# Patient Record
Sex: MALE | Race: WHITE | NOT HISPANIC OR LATINO
[De-identification: names, ages, dates, MRNs, and addresses within clinical notes are randomized per-mention and may not be internally consistent; named-entity substitution may affect disease eponyms.]

---

## 2022-05-24 ENCOUNTER — APPOINTMENT (OUTPATIENT)
Dept: UROLOGY | Facility: CLINIC | Age: 72
End: 2022-05-24
Payer: MEDICARE

## 2022-05-24 VITALS
OXYGEN SATURATION: 96 % | SYSTOLIC BLOOD PRESSURE: 122 MMHG | HEART RATE: 82 BPM | WEIGHT: 196 LBS | HEIGHT: 71 IN | BODY MASS INDEX: 27.44 KG/M2 | TEMPERATURE: 97.2 F | DIASTOLIC BLOOD PRESSURE: 76 MMHG

## 2022-05-24 DIAGNOSIS — R33.9 RETENTION OF URINE, UNSPECIFIED: ICD-10-CM

## 2022-05-24 DIAGNOSIS — Z80.42 FAMILY HISTORY OF MALIGNANT NEOPLASM OF PROSTATE: ICD-10-CM

## 2022-05-24 DIAGNOSIS — I25.10 ATHEROSCLEROTIC HEART DISEASE OF NATIVE CORONARY ARTERY W/OUT ANGINA PECTORIS: ICD-10-CM

## 2022-05-24 DIAGNOSIS — Z78.9 OTHER SPECIFIED HEALTH STATUS: ICD-10-CM

## 2022-05-24 DIAGNOSIS — R39.9 UNSPECIFIED SYMPTOMS AND SIGNS INVOLVING THE GENITOURINARY SYSTEM: ICD-10-CM

## 2022-05-24 DIAGNOSIS — Z80.3 FAMILY HISTORY OF MALIGNANT NEOPLASM OF BREAST: ICD-10-CM

## 2022-05-24 DIAGNOSIS — N52.01 ERECTILE DYSFUNCTION DUE TO ARTERIAL INSUFFICIENCY: ICD-10-CM

## 2022-05-24 DIAGNOSIS — E11.9 TYPE 2 DIABETES MELLITUS W/OUT COMPLICATIONS: ICD-10-CM

## 2022-05-24 DIAGNOSIS — E78.00 PURE HYPERCHOLESTEROLEMIA, UNSPECIFIED: ICD-10-CM

## 2022-05-24 LAB
BILIRUB UR QL STRIP: NORMAL
CLARITY UR: CLEAR
COLLECTION METHOD: NORMAL
GLUCOSE UR-MCNC: NORMAL
HCG UR QL: 0.2 EU/DL
HGB UR QL STRIP.AUTO: NORMAL
KETONES UR-MCNC: NORMAL
LEUKOCYTE ESTERASE UR QL STRIP: NORMAL
NITRITE UR QL STRIP: NORMAL
PH UR STRIP: 6.5
PROT UR STRIP-MCNC: NORMAL
SP GR UR STRIP: 1.01

## 2022-05-24 PROCEDURE — 76857 US EXAM PELVIC LIMITED: CPT

## 2022-05-24 PROCEDURE — 99205 OFFICE O/P NEW HI 60 MIN: CPT

## 2022-05-24 PROCEDURE — 81003 URINALYSIS AUTO W/O SCOPE: CPT | Mod: QW

## 2022-05-24 RX ORDER — EZETIMIBE 10 MG/1
TABLET ORAL
Refills: 0 | Status: ACTIVE | COMMUNITY

## 2022-05-24 RX ORDER — ALFUZOSIN HYDROCHLORIDE 10 MG/1
10 TABLET, EXTENDED RELEASE ORAL DAILY
Qty: 30 | Refills: 11 | Status: ACTIVE | COMMUNITY
Start: 2022-05-24 | End: 1900-01-01

## 2022-05-24 RX ORDER — CLOPIDOGREL 75 MG/1
TABLET, FILM COATED ORAL
Refills: 0 | Status: ACTIVE | COMMUNITY

## 2022-05-24 RX ORDER — INSULIN GLARGINE 100 [IU]/ML
100 INJECTION, SOLUTION SUBCUTANEOUS
Refills: 0 | Status: ACTIVE | COMMUNITY

## 2022-05-24 RX ORDER — INSULIN ASPART 100 [IU]/ML
INJECTION, SOLUTION INTRAVENOUS; SUBCUTANEOUS
Refills: 0 | Status: ACTIVE | COMMUNITY

## 2022-05-24 RX ORDER — ATORVASTATIN CALCIUM 80 MG/1
TABLET, FILM COATED ORAL
Refills: 0 | Status: ACTIVE | COMMUNITY

## 2022-05-24 NOTE — PHYSICAL EXAM
[General Appearance - Well Developed] : well developed [General Appearance - Well Nourished] : well nourished [Normal Appearance] : normal appearance [Well Groomed] : well groomed [General Appearance - In No Acute Distress] : no acute distress [Edema] : no peripheral edema [Respiration, Rhythm And Depth] : normal respiratory rhythm and effort [Exaggerated Use Of Accessory Muscles For Inspiration] : no accessory muscle use [Abdomen Soft] : soft [Abdomen Tenderness] : non-tender [Costovertebral Angle Tenderness] : no ~M costovertebral angle tenderness [Urethral Meatus] : meatus normal [Urinary Bladder Findings] : the bladder was normal on palpation [Scrotum] : the scrotum was normal [Testes Mass (___cm)] : there were no testicular masses [No Prostate Nodules] : no prostate nodules [Normal Station and Gait] : the gait and station were normal for the patient's age [] : no rash [No Focal Deficits] : no focal deficits [Oriented To Time, Place, And Person] : oriented to person, place, and time [Affect] : the affect was normal [Mood] : the mood was normal [Not Anxious] : not anxious [No Palpable Adenopathy] : no palpable adenopathy [Heart Rate And Rhythm] : Heart rate and rhythm were normal [Abdomen Mass (___ Cm)] : no abdominal mass palpated [Abdomen Hernia] : no hernia was discovered [Penis Abnormality] : normal circumcised penis [Testes Tenderness] : no tenderness of the testes [Prostate Tenderness] : the prostate was not tender [Skin Color & Pigmentation] : normal skin color and pigmentation

## 2022-05-24 NOTE — LETTER BODY
[Dear  ___] : Dear  [unfilled], [Consult Letter:] : I had the pleasure of evaluating your patient, [unfilled]. [Please see my note below.] : Please see my note below. [Consult Closing:] : Thank you very much for allowing me to participate in the care of this patient.  If you have any questions, please do not hesitate to contact me. [Sincerely,] : Sincerely, [FreeTextEntry3] : Ar Lake MD, FACS

## 2022-05-24 NOTE — ADDENDUM
[FreeTextEntry1] : A portion of this note was written by [Volodymyr De Luna] on 05/24/2022 acting as a scribe for Dr. Lake. \par \par I have personally reviewed the chart and agree that the record accurately reflects my personal performance of the history, physical exam, assessment, and plan.

## 2022-05-24 NOTE — ASSESSMENT
[FreeTextEntry1] : I discussed the findings and options with . ROBERT FRAZIER in detail.  The few PSAs that I have are somewhat fluctuant.  Based on these and his family history of prostate cancer I advised that he proceed with a prostate MRI.  I described the procedure to him as well as the limitations.  If the MRI is suggestive of prostate cancer, a prostate biopsy should be considered.  He understands that [facility] requires that the procedure be performed exclusively transperineally using a fusion technique. Currently this is performed in the operating room under general anesthesia.  Biopsy related complications were outlined including bleeding (urinary/rectal/ejaculatory), infection, urosepsis (requiring prompt hospitalization), urinary retention (requiring catheterization).  \par \par I have asked Mr. Frazier to please obtain all prior PSAs in chronological order and send them to me for my review.\par \par Regarding the relatively silent urinary retention, we agreed that he would try alfuzosin 10 mg at bedtime, monitoring his blood pressure.  The retention may be multifactorial, including a complication of the diabetes.\par \par \par

## 2022-05-24 NOTE — HISTORY OF PRESENT ILLNESS
[FreeTextEntry1] : Mr. ROBERT FRAZIER comes in today for a urologic evaluation. He presents with a PSA elevation.  He denies any systemic complaints or acute change in his voiding pattern. \par \par From his general urologic history, Mr. FRAZIER reports mild stable lower urinary tract symptoms, with nocturia x 0-1. He has good urinary control. \par IPSS: 5/35\par Sono (performed to assess bladder emptying): 295cc PVR; Prostate 61cc\par \par Mr. Frazier reports longstanding erectile dysfunction, for which he has not pursued treatment as he is not sexually active. \par \par PSAs: 5/5/22--6.72; 3/17/22--8.58; 1/27/22--5.27

## 2022-05-27 LAB — BACTERIA UR CULT: NORMAL

## 2022-06-02 ENCOUNTER — NON-APPOINTMENT (OUTPATIENT)
Age: 72
End: 2022-06-02

## 2022-06-03 ENCOUNTER — APPOINTMENT (OUTPATIENT)
Dept: UROLOGY | Facility: CLINIC | Age: 72
End: 2022-06-03
Payer: MEDICARE

## 2022-06-03 ENCOUNTER — LABORATORY RESULT (OUTPATIENT)
Age: 72
End: 2022-06-03

## 2022-06-03 VITALS
WEIGHT: 196 LBS | SYSTOLIC BLOOD PRESSURE: 105 MMHG | HEIGHT: 71 IN | DIASTOLIC BLOOD PRESSURE: 66 MMHG | BODY MASS INDEX: 27.44 KG/M2 | RESPIRATION RATE: 16 BRPM | HEART RATE: 70 BPM | OXYGEN SATURATION: 98 %

## 2022-06-03 PROCEDURE — 99214 OFFICE O/P EST MOD 30 MIN: CPT

## 2022-06-03 NOTE — ASSESSMENT
[FreeTextEntry1] : 71 year old man with PSA 6.7, prostate MRI with 51 cc prostate, 2.8 cm PIRADS 5 lesion in the anterior midline and 2.0 cm PIRADS 3 lesion in the left PZPL base. He has chronic urinary retention with elevated PVR, recently started alfuzosin.\par \par Prostate cancer screening: the patient and I spoke at length about prostate cancer screening, its risks and its benefits. He understands that many men with prostate cancer will die with the disease rather than of it and we also discussed the results large multi-center American and  prostate cancer screening trials. He also understands that PSA in and of itself does not diagnose prostate cancer but only assesses risk to a certain degree. The patient understands that to definitively screen for prostate cancer, a biopsy is required and this procedure has risks, including bleeding, infection, ED and urinary retention.  The patient opted to move forward with the biopsy, which is performed via a transperineal approach with MRI/US guided fusion targeting.\par \par The patient is aware to expect hematuria x 2 weeks and up to 4 weeks of hematospermia.  There is a risk of infection albeit much lower than a transrectal approach. In some cases patients can experience erectile dysfunction but this is usually self limiting.  Any fever/chills after the biopsy the patient is to contact the office and go to the ER for an immediate evaluation. He has been given paper instructions outlining these items - which includes medications to avoid prior to surgery.\par \par 1. CBC, BMP, PSA, Covid Test, UA UCx\par 2. PCP Clearance and cardiac clearance to hold plavix\par 3. TP biopsy at [facility]\par 4. follow up 2 weeks after biopsy with his primary urologist or ourselves.\par 5. we will call with the path results once they are resulted.\par

## 2022-06-03 NOTE — LETTER BODY
[Dear  ___] : Dear  [unfilled], [Courtesy Letter:] : I had the pleasure of seeing your patient, [unfilled], in my office today. [Please see my note below.] : Please see my note below. [Consult Closing:] : Thank you very much for allowing me to participate in the care of this patient.  If you have any questions, please do not hesitate to contact me. [Sincerely,] : Sincerely, [FreeTextEntry3] : Uri Diez MD

## 2022-06-16 NOTE — ASU PATIENT PROFILE, ADULT - NS PREOP UNDERSTANDS INFO
No solid food, dairy, candy or gum after midnight Sunday, water or clear apple juice is allowed before 10:45am Monday morning. Come with Photo ID, vaccination and insurance card, no smoking, no alcohol drinking or illicit drug use on Sunday, dress in comfortable clothes, no jewelries or valuable, escort must show proof of vaccination and photo ID, come with credit card or check for any co-payment. Address and call back number provided/yes No solid food, dairy, candy or chewing gum after midnight, water is allowed before 10:15am tomorrow morning. Patient instructed to come with photo ID, vaccination and insurance card, dress in comfortable clothes with opening to the front. No jewelries, no smoking, no illicit drug use, no alcohol drinking tonight, escort must show proof of vaccination and photo ID. Reminded to bring credit  card or check for co-payment, address and call back number provided./yes

## 2022-06-16 NOTE — ASU PATIENT PROFILE, ADULT - NSICDXPASTSURGICALHX_GEN_ALL_CORE_FT
PAST SURGICAL HISTORY:  H/O cardiac catheterization with stent 10 years ago(pt does not know how many stent

## 2022-06-16 NOTE — ASU PATIENT PROFILE, ADULT - NSICDXPASTMEDICALHX_GEN_ALL_CORE_FT
PAST MEDICAL HISTORY:  Coronary atherosclerosis of native coronary artery CAD S/P percutaneous coronary angioplasty    Essential hypertension HTN (hypertension)    Hyperlipidemia HLD (hyperlipidemia)    Paranoid schizophrenia Paranoid schizophrenia    Type 2 diabetes mellitus DM (diabetes mellitus)     PAST MEDICAL HISTORY:  Coronary atherosclerosis of native coronary artery CAD S/P percutaneous coronary angioplasty    Essential hypertension HTN (hypertension)    Paranoid schizophrenia Paranoid schizophrenia    Type 2 diabetes mellitus DM (diabetes mellitus)     PAST MEDICAL HISTORY:  Coronary atherosclerosis of native coronary artery CAD S/P percutaneous coronary angioplasty    Essential hypertension HTN (hypertension)    Hiatal hernia     Paranoid schizophrenia Paranoid schizophrenia    Type 2 diabetes mellitus DM (diabetes mellitus)

## 2022-06-16 NOTE — ASU PATIENT PROFILE, ADULT - FALL HARM RISK - UNIVERSAL INTERVENTIONS
Bed in lowest position, wheels locked, appropriate side rails in place/Call bell, personal items and telephone in reach/Instruct patient to call for assistance before getting out of bed or chair/Non-slip footwear when patient is out of bed/[place] to call system/Physically safe environment - no spills, clutter or unnecessary equipment/Purposeful Proactive Rounding/Room/bathroom lighting operational, light cord in reach

## 2022-06-16 NOTE — ASU PATIENT PROFILE, ADULT - 
ADDITIONAL INFORMATION
Will bring card with vaccine dates     Pcr 6/15/22 Will bring card with vaccine dates     Pcr 6/21/22 [facility]

## 2022-06-22 ENCOUNTER — TRANSCRIPTION ENCOUNTER (OUTPATIENT)
Age: 72
End: 2022-06-22

## 2022-06-22 NOTE — ASU DISCHARGE PLAN (ADULT/PEDIATRIC) - NS MD DC FALL RISK RISK
For information on Fall & Injury Prevention, visit: [email or web address] OR  [email or web address] OR  https://www.cdc.gov/steadi/patient.html

## 2022-06-22 NOTE — ASU DISCHARGE PLAN (ADULT/PEDIATRIC) - CARE PROVIDER_API CALL
Marilee Dan (DO)  Urology  [address] [place] [address]  Phone: [phone]  Fax: [phone]  Follow Up Time: 2 weeks

## 2022-06-22 NOTE — BRIEF OPERATIVE NOTE - NSICDXBRIEFPROCEDURE_GEN_ALL_CORE_FT
PROCEDURES:  Biopsy of prostate by transperineal approach with integrated magnetic resonance-ultrasound guidance 22-Jun-2022 19:56:40  Trevor Hernandez

## 2022-06-23 ENCOUNTER — RESULT REVIEW (OUTPATIENT)
Age: 72
End: 2022-06-23

## 2022-06-23 ENCOUNTER — OUTPATIENT (OUTPATIENT)
Dept: OUTPATIENT SERVICES | Facility: HOSPITAL | Age: 72
LOS: 1 days | Discharge: HOME | End: 2022-06-23

## 2022-06-23 ENCOUNTER — APPOINTMENT (OUTPATIENT)
Dept: UROLOGY | Facility: AMBULATORY SURGERY CENTER | Age: 72
End: 2022-06-23

## 2022-06-23 VITALS
DIASTOLIC BLOOD PRESSURE: 61 MMHG | TEMPERATURE: 97 F | WEIGHT: 191.36 LBS | HEART RATE: 85 BPM | OXYGEN SATURATION: 97 % | SYSTOLIC BLOOD PRESSURE: 112 MMHG | RESPIRATION RATE: 15 BRPM | HEIGHT: 71 IN

## 2022-06-23 VITALS
SYSTOLIC BLOOD PRESSURE: 135 MMHG | DIASTOLIC BLOOD PRESSURE: 58 MMHG | TEMPERATURE: 98 F | OXYGEN SATURATION: 99 % | RESPIRATION RATE: 16 BRPM | HEART RATE: 66 BPM

## 2022-06-23 DIAGNOSIS — Z98.890 OTHER SPECIFIED POSTPROCEDURAL STATES: Chronic | ICD-10-CM

## 2022-06-23 LAB
GLUCOSE BLDC GLUCOMTR-MCNC: 254 MG/DL — HIGH (ref 70–99)
GLUCOSE BLDC GLUCOMTR-MCNC: 258 MG/DL — HIGH (ref 70–99)
GLUCOSE BLDC GLUCOMTR-MCNC: 269 MG/DL — HIGH (ref 70–99)
GLUCOSE BLDC GLUCOMTR-MCNC: 298 MG/DL — HIGH (ref 70–99)
GLUCOSE BLDC GLUCOMTR-MCNC: 300 MG/DL — HIGH (ref 70–99)

## 2022-06-23 PROCEDURE — 76377 3D RENDER W/INTRP POSTPROCES: CPT | Mod: 26

## 2022-06-23 PROCEDURE — 88305 TISSUE EXAM BY PATHOLOGIST: CPT | Mod: 26

## 2022-06-23 PROCEDURE — 55706 BX PRST8 NDL SAT SAMPLING: CPT | Mod: 22

## 2022-06-23 PROCEDURE — 76872 US TRANSRECTAL: CPT | Mod: 26

## 2022-06-23 RX ORDER — EZETIMIBE 10 MG/1
1 TABLET ORAL
Qty: 0 | Refills: 0 | DISCHARGE

## 2022-06-23 RX ORDER — INSULIN HUMAN 100 [IU]/ML
2 INJECTION, SOLUTION SUBCUTANEOUS ONCE
Refills: 0 | Status: COMPLETED | OUTPATIENT
Start: 2022-06-23 | End: 2022-06-23

## 2022-06-23 RX ORDER — SODIUM CHLORIDE 9 MG/ML
1000 INJECTION, SOLUTION INTRAVENOUS
Refills: 0 | Status: DISCONTINUED | OUTPATIENT
Start: 2022-06-23 | End: 2022-06-23

## 2022-06-23 RX ORDER — FENTANYL CITRATE 50 UG/ML
25 INJECTION INTRAVENOUS ONCE
Refills: 0 | Status: DISCONTINUED | OUTPATIENT
Start: 2022-06-23 | End: 2022-06-23

## 2022-06-23 RX ORDER — ZINC SULFATE TAB 220 MG (50 MG ZINC EQUIVALENT) 220 (50 ZN) MG
1 TAB ORAL
Qty: 0 | Refills: 0 | DISCHARGE

## 2022-06-23 RX ORDER — INSULIN ASPART 100 [IU]/ML
0 INJECTION, SOLUTION SUBCUTANEOUS
Qty: 0 | Refills: 0 | DISCHARGE

## 2022-06-23 RX ORDER — OXYCODONE HYDROCHLORIDE 5 MG/1
5 TABLET ORAL ONCE
Refills: 0 | Status: DISCONTINUED | OUTPATIENT
Start: 2022-06-23 | End: 2022-06-23

## 2022-06-23 RX ORDER — LIDOCAINE HCL 20 MG/ML
10 VIAL (ML) INJECTION ONCE
Refills: 0 | Status: COMPLETED | OUTPATIENT
Start: 2022-06-23 | End: 2022-06-23

## 2022-06-23 RX ORDER — PALIPERIDONE 1.5 MG/1
0 TABLET, EXTENDED RELEASE ORAL
Qty: 0 | Refills: 0 | DISCHARGE

## 2022-06-23 RX ORDER — ACETAMINOPHEN 500 MG
650 TABLET ORAL ONCE
Refills: 0 | Status: DISCONTINUED | OUTPATIENT
Start: 2022-06-23 | End: 2022-06-23

## 2022-06-23 RX ORDER — INSULIN GLARGINE 100 [IU]/ML
0 INJECTION, SOLUTION SUBCUTANEOUS
Qty: 0 | Refills: 0 | DISCHARGE

## 2022-06-23 RX ORDER — ONDANSETRON 8 MG/1
4 TABLET, FILM COATED ORAL ONCE
Refills: 0 | Status: DISCONTINUED | OUTPATIENT
Start: 2022-06-23 | End: 2022-06-23

## 2022-06-23 RX ORDER — ATORVASTATIN CALCIUM 80 MG/1
1 TABLET, FILM COATED ORAL
Qty: 0 | Refills: 0 | DISCHARGE

## 2022-06-23 RX ORDER — CLOPIDOGREL BISULFATE 75 MG/1
1 TABLET, FILM COATED ORAL
Qty: 0 | Refills: 0 | DISCHARGE

## 2022-06-23 RX ADMIN — SODIUM CHLORIDE 100 MILLILITER(S): 9 INJECTION, SOLUTION INTRAVENOUS at 18:44

## 2022-06-23 RX ADMIN — SODIUM CHLORIDE 100 MILLILITER(S): 9 INJECTION, SOLUTION INTRAVENOUS at 16:38

## 2022-06-23 RX ADMIN — Medication 10 MILLILITER(S): at 20:30

## 2022-06-23 RX ADMIN — INSULIN HUMAN 2 UNIT(S): 100 INJECTION, SOLUTION SUBCUTANEOUS at 13:52

## 2022-06-23 NOTE — PROVIDER CONTACT NOTE (OTHER) - ACTION/TREATMENT ORDERED:
Regular Insulin 2 Units given subcutaneous as ordered at 13:52 Regular Insulin 2 Units given subcutaneous as ordered at 13:52    FSBS rechecked at 2:22pm result 269 Dr Rodarte made aware okayed for surgery

## 2022-06-24 ENCOUNTER — APPOINTMENT (OUTPATIENT)
Dept: UROLOGY | Facility: CLINIC | Age: 72
End: 2022-06-24
Payer: MEDICARE

## 2022-06-24 DIAGNOSIS — R97.20 ELEVATED PROSTATE, SPECIFIC ANTIGEN [PSA]: ICD-10-CM

## 2022-06-24 PROBLEM — K44.9 DIAPHRAGMATIC HERNIA WITHOUT OBSTRUCTION OR GANGRENE: Chronic | Status: ACTIVE | Noted: 2022-06-23

## 2022-06-24 PROCEDURE — 51798 US URINE CAPACITY MEASURE: CPT

## 2022-06-24 PROCEDURE — 99214 OFFICE O/P EST MOD 30 MIN: CPT

## 2022-06-24 NOTE — HISTORY OF PRESENT ILLNESS
[FreeTextEntry1] : Mr. ROBERT BARRERA presents today for void trial. He had prostate biopsy yesterday. He urinated post-procedure with residuals up to 900 cc. A catheter was placed with more than 1 L drainage. No issues since catheter placement. The catheter was removed today and patient was able to void 150 cc with  cc.

## 2022-06-24 NOTE — PHYSICAL EXAM
[General Appearance - Well Developed] : well developed [General Appearance - Well Nourished] : well nourished [Normal Appearance] : normal appearance [Well Groomed] : well groomed [General Appearance - In No Acute Distress] : no acute distress [Abdomen Soft] : soft [Abdomen Tenderness] : non-tender [Costovertebral Angle Tenderness] : no ~M costovertebral angle tenderness [Urethral Meatus] : meatus normal [Urinary Bladder Findings] : the bladder was normal on palpation [Scrotum] : the scrotum was normal [Testes Mass (___cm)] : there were no testicular masses [Edema] : no peripheral edema [] : no respiratory distress [Respiration, Rhythm And Depth] : normal respiratory rhythm and effort [Exaggerated Use Of Accessory Muscles For Inspiration] : no accessory muscle use [Oriented To Time, Place, And Person] : oriented to person, place, and time [Affect] : the affect was normal [Mood] : the mood was normal [Not Anxious] : not anxious [Normal Station and Gait] : the gait and station were normal for the patient's age [No Focal Deficits] : no focal deficits

## 2022-06-24 NOTE — ASSESSMENT
[FreeTextEntry1] : S/P prostate biopsy with post-op urinary retention. Catheter removed today and patient able to void.\par \par  - F/U pathology

## 2022-06-27 LAB — SURGICAL PATHOLOGY STUDY: SIGNIFICANT CHANGE UP

## 2022-06-28 ENCOUNTER — NON-APPOINTMENT (OUTPATIENT)
Age: 72
End: 2022-06-28

## 2022-07-08 ENCOUNTER — APPOINTMENT (OUTPATIENT)
Dept: UROLOGY | Facility: CLINIC | Age: 72
End: 2022-07-08

## 2025-02-28 NOTE — ASU PATIENT PROFILE, ADULT - SUPPORT PERSON NAME
Dextrose 50% 12.5 Grams IV Push/4 oz Fruit Juice... 4 oz Fruit Juice... Spouse ........  Deja Frazier

## (undated) DEVICE — GRID BRACHYTHERAPY EZ 18G

## (undated) DEVICE — PREP BETADINE SPONGE STICKS

## (undated) DEVICE — NDL HYPO REGULAR BEVEL 25G X 1.5" (BLUE)

## (undated) DEVICE — DRSG TELFA 3 X 8

## (undated) DEVICE — BALLOON ENDOCAVITY 2X14CM

## (undated) DEVICE — NDL MAX CORE 18G X 25CM

## (undated) DEVICE — SYR LUER LOK 10CC

## (undated) DEVICE — PLASTIC SOLUTION BOWL 160Z

## (undated) DEVICE — VENODYNE/SCD SLEEVE CALF MEDIUM

## (undated) DEVICE — NDL BIOPSY CHIBA 22G X 20CM

## (undated) DEVICE — WARMING BLANKET UPPER ADULT

## (undated) DEVICE — DRAPE MEDIUM SHEET 44" X 70"

## (undated) DEVICE — SYR CATH TIP 2 OZ

## (undated) DEVICE — GLV 7.5 PROTEXIS (WHITE)

## (undated) DEVICE — DRAPE TOWEL BLUE 17" X 24"

## (undated) DEVICE — SYR LUER LOK 50CC